# Patient Record
Sex: MALE | Race: OTHER | NOT HISPANIC OR LATINO | Employment: UNEMPLOYED | URBAN - METROPOLITAN AREA
[De-identification: names, ages, dates, MRNs, and addresses within clinical notes are randomized per-mention and may not be internally consistent; named-entity substitution may affect disease eponyms.]

---

## 2020-07-23 ENCOUNTER — OFFICE VISIT (OUTPATIENT)
Dept: URGENT CARE | Age: 30
End: 2020-07-23
Payer: COMMERCIAL

## 2020-07-23 VITALS
WEIGHT: 190 LBS | BODY MASS INDEX: 31.65 KG/M2 | DIASTOLIC BLOOD PRESSURE: 66 MMHG | HEIGHT: 65 IN | SYSTOLIC BLOOD PRESSURE: 133 MMHG | RESPIRATION RATE: 18 BRPM | TEMPERATURE: 99.2 F | OXYGEN SATURATION: 98 % | HEART RATE: 84 BPM

## 2020-07-23 DIAGNOSIS — L03.012 INFECTION OF NAIL BED OF FINGER, LEFT: Primary | ICD-10-CM

## 2020-07-23 PROCEDURE — G0382 LEV 3 HOSP TYPE B ED VISIT: HCPCS | Performed by: PHYSICIAN ASSISTANT

## 2020-07-23 RX ORDER — CEPHALEXIN 500 MG/1
500 CAPSULE ORAL EVERY 8 HOURS SCHEDULED
Qty: 21 CAPSULE | Refills: 0 | Status: SHIPPED | OUTPATIENT
Start: 2020-07-23 | End: 2020-07-30

## 2020-07-23 NOTE — PROGRESS NOTES
[facility]        NAME: Gayle Dominguez is a 26 y o  male  : 1990    MRN: [id number]  DATE: 2020  TIME: 3:50 PM    Assessment and Plan   Infection of nail bed of finger, left [L03 012]  1  Infection of nail bed of finger, left  cephalexin (KEFLEX) 500 mg capsule         Patient Instructions       Continue to monitor symptoms  If new or worsening symptoms occur such as worsening redness, discharge, redness spreading up your extremity, fevers chills, nausea vomiting, or any concerning symptoms occur go immediately to the ER  Follow up with family doctor this week  Chief Complaint     Chief Complaint   Patient presents with    Hand Injury     pt reports pain and swelling in left 5th finger  pt denies injury  pt states he tried to relieve pressure by ouncturing finger with pin  pt reports discharge from site but states pain and swelling persist          History of Present Illness       Hand Injury    Incident onset: No specific injury  About a week ago he noticed pain and a pimple to L 5th digit  Slowly worsening  There was no injury mechanism  The quality of the pain is described as aching (throbbing)  The pain is moderate  The pain has been constant since the incident  Pertinent negatives include no chest pain, numbness or tingling  The symptoms are aggravated by movement and palpation  Treatments tried: Pt attempted to pop it with pimple and this caused swelling and pain to increase  Review of Systems   Review of Systems   Constitutional: Negative for chills, diaphoresis, fatigue and fever  HENT: Negative for congestion, ear pain, postnasal drip, rhinorrhea, sinus pressure, sinus pain, sneezing, sore throat and trouble swallowing  Eyes: Negative  Respiratory: Negative for cough, chest tightness, shortness of breath and wheezing  Cardiovascular: Negative  Negative for chest pain and palpitations     Gastrointestinal: Negative for abdominal distention, diarrhea, nausea and vomiting  Endocrine: Negative  Genitourinary: Negative for dysuria  Musculoskeletal: Negative  Skin: Positive for rash  Negative for pallor  Allergic/Immunologic: Negative  Neurological: Negative  Negative for tingling, light-headedness, numbness and headaches  Hematological: Negative  Psychiatric/Behavioral: Negative  Current Medications       Current Outpatient Medications:     cephalexin (KEFLEX) 500 mg capsule, Take 1 capsule (500 mg total) by mouth every 8 (eight) hours for 7 days, Disp: 21 capsule, Rfl: 0    Current Allergies     Allergies as of 07/23/2020    (No Known Allergies)            The following portions of the patient's history were reviewed and updated as appropriate: allergies, current medications, past family history, past medical history, past social history, past surgical history and problem list      History reviewed  No pertinent past medical history  Past Surgical History:   Procedure Laterality Date    HERNIA REPAIR      VARICOSE VEIN SURGERY         History reviewed  No pertinent family history  Medications have been verified  Objective   /66   Pulse 84   Temp 99 2 °F (37 3 °C)   Resp 18   Ht 5' 5" (1 651 m)   Wt 86 2 kg (190 lb)   SpO2 98%   BMI 31 62 kg/m²        Physical Exam     Physical Exam   Constitutional: He appears well-developed and well-nourished  No distress  HENT:   Head: Normocephalic and atraumatic  Cardiovascular: Normal rate, regular rhythm, normal heart sounds and intact distal pulses  Pulmonary/Chest: Effort normal and breath sounds normal  No respiratory distress  He has no wheezes  He has no rales  Musculoskeletal:        Left hand: He exhibits decreased range of motion (Due to swelling, 5th feels "fat" and cant bend more than penitentiary ), tenderness and swelling  He exhibits no bony tenderness and normal capillary refill  Normal sensation noted  Normal strength noted          Hands:  Skin: Skin is warm  Capillary refill takes less than 2 seconds  No rash noted  He is not diaphoretic  No pallor  Nursing note and vitals reviewed  [Follow-Up Visit] : a follow-up visit for

## 2020-07-23 NOTE — PATIENT INSTRUCTIONS
Continue to monitor symptoms  If new or worsening symptoms occur such as worsening redness, discharge, redness spreading up your extremity, fevers chills, nausea vomiting, or any concerning symptoms occur go immediately to the ER  Follow up with family doctor this week  ÇáÊåÇÈ ÇáäÓíÌ ÇáÎáæí   ÇáÑÚÇíÉ ÇáÎÇÑÌíÉ ááãÑÖì:   ÇáÊåÇÈ ÇáäÓíÌ ÇáÎáæí  åæ ÚÏæì ÌáÏíÉ ÊÓÈÈåÇ ÇáÈßÊÑíÇ  ÛÇáÈðÇ ãÇ íÙåÑ ÇáÊåÇÈ ÇáäÓíÌ ÇáÎáæí Úáì ÇáÓíÞÇä¡ æÇáÞÏãíä¡ æÇáÐÑÇÚíä¡ æÇáíÏíä¡ Ãæ ÇáæÌå  ÊÊÖãä XPOVWWKD æÇáÃÚÑÇÖ ÇáÔÇÆÚÉ ãÇ íáí:   · ÊÚÑøÖ ãäØÞÉ ãä ÇáÌáÏ ááÇÍãÑÇÑ æÇáÓÎæäÉ æÇáÊæÑøã    · Ãáã ÚäÏ áãÓ ÇáãäØÞÉ    · äÊæÁÇÊ æÈËæÑ (ÎÑøÇÌ) ÞÏ íÄÏí Åáì ÅÎÑÇÌ ÕÏíÏ  · äÊæÁ WSENU æÇÑÊÝÇÚå áíÈÏæ ßÞÔÑ [id number]  ÇÊÕá ÈÑÞã 911 ÅÐÇ:   · ßäÊ ÊÚÇäí ãä ÕÚæÈÉ ãÝÇÌÆÉ Ýí ÇáÊäÝÓ Ãæ Ãáã Ýí ÇáÕÏÑ  ÇÍÕá Úáì ÑÚÇíÉ ÝæÑðÇ ÅÐÇ:   · Stacey Talia TLNBV ÃßÈÑ ÍÌãðÇ EOWFCF ÈÕæÑÉ ÃßÈÑ  · ÔÚÑÊ ÈÝÑÞÚÉ ÊÍÊ ÌáÏß ÚäÏ áãÓå  · ßÇä áÏíß äÞØ Ãæ ßÊá ÃÑÌæÇäíÉ Úáì ÌáÏß¡ Ãæ ÔÇåÏÊ äÒíÝÇ ÊÍÊ ÌáÏß  · ßäÊ ÊÚÇäí ãä ÊæÑã æÃáã ÌÏíÏ Ýí ÓÇÞíß  · ßÇäÊ ÇáãäØÞÉ ÇáÍãÑÇÁ Ãæ ÇáÏÇÝÆÉ Ãæ ÇáãÊæÑãÉ íßÈÑ ÍÌãåÇ  · ÞÏ ÊÑì ÎØæØðÇ ÍãÑÇÁ ÊÎÑÌ ãä ÇáãäØÞÉ ÇáãÕÇÈÉ  íõÑÌì OAONJBV ÈãÞÏã ÇáÑÚÇíÉ ÇáÕÍíÉ áÏíß Ýí TQJRRWK ÇáÊÇáíÉ:   · Ýí ÍÇáÉ ÇáÅÕÇÈÉ PTKWO ãä ÇáÍãì (ÇÑÊÝÇÚ ÇáÍÑÇÑÉ)  · áã íÒá ÇáÃáã Ãæ ÇáÍãì Ãæ ÅÐÇ ßÇä íÒÏÇÏ ÓæÁðÇ  · áÇ ÊÕÛÑ ÇáãäØÞÉ ÈÚÏ ãÑæÑ íæãíä ãä ÊäÇæá ÇáãÖÇÏÇÊ ÇáÍíæíÉ  · ÈÏÃ ÌáÏß Ýí ÇáÊÞÔÑ Ãæ Êßæíä ÇáÞÔæÑ  · Beverly Fluke SOBBBAXIT Ãæ ãÎÇæÝ ZOECOA ÈÍÇáÊß Ãæ PUDQKHQF ÇáÊí ÊÊáÞÇåÇ  ÚáÇÌ ÇáÊåÇÈ ÇáäÓíÌ ÇáÎáæí  íÓÇåã Ýí ÊÎÝíÝ ÇáÃÚÑÇÖ¡ æãäÚ ÇáÚÏæì ãä BKNXTIDK¡ æÚáÇÌ ÇáÚÏæì  íÚÊãÏ ÇáÚáÇÌ Úáì ãÏì ÔÏÉ ÇáÊåÇÈ ÇáäÓíÌ ÇáÎáæí  ÞÏ ÊõÔÝì ÍÇáÉ ÇáäÓíÌ ÇáÎáæí ãä ÊáÞÇÁ äÝÓåÇ  ÞÏ  ÊÍÊÇÌ ÈÏáÇð ãä Ðáß Åáì ÊäÇæá ÇáãÖÇÏÇÊ ÇáÍíæíÉ ááãÓÇÚÏÉ Ýí ÚáÇÌ ÇáÚÏæì ÇáÈßÊíÑíÉ æÊäÇæá ÇáÃÏæíÉ áÊÎÝíÝ ÇáÃáã  ÞÏ íÑÓã ãÞÏã ÇáÑÚÇíÉ ÇáÕÍíÉ ÇáÐí YGAZYR ãÚå ÏÇÆÑÉ Íæá ÇáÍæÇÝ ÇáãÍíØÉ ÈÇáÊåÇÈ ÇáäÓíÌ ÇáÎáæí áÏíß  ÅÐÇ ÇäÊÔÑ ÇáÊåÇÈ ÇáäÓíÌ ÇáÎáæí¡ ÝÓæÝ íáÇÍÙ ãÞÏã ÇáÑÚÇíÉ ÇáÕÍíÉ ÇäÊÔÇÑ ÇáÍÇáÉ ÎÇÑÌ ÇáÏÇÆÑÉ    [place]:   · ÇÑÝÚ ÇáãäØÞÉ áÊßæä ÝæÞ ãÓÊæì ÇáÞáÈ  ÈÞÏÑ ãÇ ÊÓÊØíÚ  ÓíÓÇÚÏ Ðáß Úáì ÊÎÝíÝ ÇáÃáã æÇáÊæÑã  ÇÓäÏ ÇáãäØÞÉ Úáì æÓÇÆÏ Ãæ ÈØÇäíÇÊ ááÍÝÇÙ Úáì ÈÞÇÆåÇ ãÑÝæÚÉ ÈÔßá ãÑíÍ  · Þã ÈÊäÙíÝ ÇáãäØÞÉ íæãíðÇ ÍÊì ÊÎÊÝí ÞÔæÑ ÇáÌÑÍ  ÇÛÓá ÇáãäØÞÉ ÈÇáãÇÁ æÇáÕÇÈæä ÈÑÝÞ  ÌÝÝ ÇáãäØÞÉ ÌíÏðÇ  Verta Alevism DOOHCVCL Edra Man JEKOERCBC  · Þã ÈæÖÚ ÞØÚÉ ÞãÇÔ ÑØÈÉ¡ Ãæ ÈÇÑÏÉ¡ Ãæ ÏÇÝÆÉ Úáì ÇáãäØÞÉ ÍÓÈ FGQOLPRTF  ÇÓÊÎÏã ÞØÚÉ ÞãÇÔ äÙíÝÉ æãÇÁ äÙíÝ  ÇÊÑßåÇ Úáì ÇáãäØÞÉ ÍÊì íßæä ááÞãÇÔÉ äÝÓ ÏÑÌÉ ÍÑÇÑÉ ÇáÛÑÝÉ  ÌÝÝ ÇáãäØÞÉ ÌíÏðÇ LGBPXNOU ÞØÚÉ ÞãÇÔ äÙíÝÉ æÌÇÝÉ  ÞÏ ÊÓÇÚÏ ÞØÚÉ ÇáÞãÇÔ Úáì ÊÎÝíÝ ÇáÃáã  ÇáæÞÇíÉ ãä ÇáÊåÇÈ ÇáäÓíÌ ÇáÎáæí:   · áÇ ÊÞã ÈÍß áÏÛÇÊ ÇáÍÔÑÇÊ Ãæ ãäÇØÞ ÇáÅÕÇÈÉ  ÝåÐÇ ÇáÝÚá íÄÏí Åáì ÒíÇÏÉ ÎØÑ ÇáÊåÇÈ ÇáäÓíÌ ÇáÎáæí ãä ÎáÇá Íß Êáß ÇáãäÇØÞ  · áÇ ÊÊÔÇÑß ÇáÃÛÑÇÖ ÇáÔÎÕíÉ¡ ãËá ÇáãäÇÔÝ æÇáãáÇÈÓ æÃãæÇÓ ÇáÍáÇÞÉ  · äÙÝ ÇáÃÏæÇÊ ÇáÑíÇÖíÉ  ÈãäÙÝ ÞÇÊá ááÌÑÇËíã ÞÈá æÈÚÏ ÇÓÊÎÏÇãå  · ÇÛÓá íÏíß ÏÇÆãðÇ  ÇÓÊÎÏã ÇáãÇÁ æÇáÕÇÈæä  ÇÛÓá íÏíß ÈÚÏ ÇÓÊÎÏÇã ÇáÍãÇã¡ Ãæ ÊÛííÑ ÍÝÇÖÇÊ ÇáØÝá Ãæ ÇáÚØÓ  ÇÛÓá íÏíß ÞÈá ÅÚÏÇÏ ÇáØÚÇã Ãæ ÊäÇæáå  ÇÓÊÎÏã TNDJHIY GSWLW áãäÚ ÌÝÇÝ ÇáÌáÏ æÊÔÞÞå  · ÇÑÊÏ ÌæÇÑÈ ÇáÖÛØ ÍÓÈ JLKBBWRBV  ÞÏ íõØáÈ ãäß ÇÑÊÏÇÁ ÌæÇÑÈ Ýí ÍÇá ßäÊ ÊÚäí ãä æÐãÉ ãÍíØíÉ  ÝÇáÌæÇÈ ÊÚãá Úáì ÊÍÓíä ÊÏÝÞ ÇáÏã æÊÎÝíÝ ÇáÊæÑã  · ÚáÇÌ ÊíäíÇ ÇáÞÏã ÇáÑíÇÖíÉ  íãßä Ãä íÓåã Ðáß Ýí ÇáæÞÇíÉ ãä ÇäÊÔÇÑ ÇáÚÏæì ÇáÈßÊíÑíÉ Ýí ÇáÌáÏ  íÌÈ ÇáãÊÇÈÚÉ ãÚ ãÞÏã ÇáÑÚÇíÉ ÇáÕÍíÉ áÏíß ÎáÇá 3 ÃíÇã Ãæ æÝÞðÇ ááÅÑÔÇÏÇÊ ÇáÊÇáíÉ:  ÓæÝ íÞæã ãÞÏã ÇáÑÚÇíÉ ÇáÕÍíÉ ÈÝÍÕß ááÊÃßÏ ãä ÊÍÓä ÍÇáÉ ÇáÊåÇÈ ÇáäÓíÌ ÇáÎáæí  ÞÏ ÊÍÊÇÌ áÊäÇæá ÏæÇÁ ãÎÊáÝ  íõÝÖá ÊÏæíä ÇáÃÓÆáÉ ÍÊì áÇ íÊã äÓíÇäåÇ ÃËäÇÁ ÒíÇÑÉ ÇáØÈíÈ  © 2017 [address] [employer] Information is for End User's use only and may not be sold, redistributed or otherwise used for commercial purposes  All illustrations and images included in CareNotes® are the copyrighted property of [employer]  or Bran Klein  The above information is an  only  It is not intended as medical advice for individual conditions or treatments   Talk to your doctor, nurse or pharmacist before following any medical regimen to see if it is safe and effective for you